# Patient Record
Sex: MALE | Race: WHITE | NOT HISPANIC OR LATINO | URBAN - METROPOLITAN AREA
[De-identification: names, ages, dates, MRNs, and addresses within clinical notes are randomized per-mention and may not be internally consistent; named-entity substitution may affect disease eponyms.]

---

## 2022-10-04 ENCOUNTER — EMERGENCY (EMERGENCY)
Facility: HOSPITAL | Age: 72
LOS: 0 days | Discharge: HOME | End: 2022-10-04
Attending: EMERGENCY MEDICINE
Payer: MEDICARE

## 2022-10-04 ENCOUNTER — TRANSCRIPTION ENCOUNTER (OUTPATIENT)
Age: 72
End: 2022-10-04

## 2022-10-04 VITALS
HEART RATE: 85 BPM | OXYGEN SATURATION: 100 % | TEMPERATURE: 98 F | DIASTOLIC BLOOD PRESSURE: 80 MMHG | RESPIRATION RATE: 18 BRPM | SYSTOLIC BLOOD PRESSURE: 142 MMHG

## 2022-10-04 VITALS — HEIGHT: 69 IN | WEIGHT: 169.98 LBS

## 2022-10-04 DIAGNOSIS — R50.9 FEVER, UNSPECIFIED: ICD-10-CM

## 2022-10-04 DIAGNOSIS — K59.00 CONSTIPATION, UNSPECIFIED: ICD-10-CM

## 2022-10-04 DIAGNOSIS — N41.9 INFLAMMATORY DISEASE OF PROSTATE, UNSPECIFIED: ICD-10-CM

## 2022-10-04 DIAGNOSIS — R19.7 DIARRHEA, UNSPECIFIED: ICD-10-CM

## 2022-10-04 DIAGNOSIS — Z90.49 ACQUIRED ABSENCE OF OTHER SPECIFIED PARTS OF DIGESTIVE TRACT: ICD-10-CM

## 2022-10-04 DIAGNOSIS — R10.9 UNSPECIFIED ABDOMINAL PAIN: ICD-10-CM

## 2022-10-04 DIAGNOSIS — N45.1 EPIDIDYMITIS: ICD-10-CM

## 2022-10-04 DIAGNOSIS — K76.0 FATTY (CHANGE OF) LIVER, NOT ELSEWHERE CLASSIFIED: ICD-10-CM

## 2022-10-04 DIAGNOSIS — N39.0 URINARY TRACT INFECTION, SITE NOT SPECIFIED: ICD-10-CM

## 2022-10-04 DIAGNOSIS — N50.89 OTHER SPECIFIED DISORDERS OF THE MALE GENITAL ORGANS: ICD-10-CM

## 2022-10-04 DIAGNOSIS — Z90.89 ACQUIRED ABSENCE OF OTHER ORGANS: ICD-10-CM

## 2022-10-04 DIAGNOSIS — N40.0 BENIGN PROSTATIC HYPERPLASIA WITHOUT LOWER URINARY TRACT SYMPTOMS: ICD-10-CM

## 2022-10-04 LAB
ALBUMIN SERPL ELPH-MCNC: 3 G/DL — LOW (ref 3.3–5)
ALP SERPL-CCNC: 85 U/L — SIGNIFICANT CHANGE UP (ref 40–120)
ALT FLD-CCNC: 44 U/L — SIGNIFICANT CHANGE UP (ref 12–78)
ANION GAP SERPL CALC-SCNC: 5 MMOL/L — SIGNIFICANT CHANGE UP (ref 5–17)
APPEARANCE UR: ABNORMAL
AST SERPL-CCNC: 17 U/L — SIGNIFICANT CHANGE UP (ref 15–37)
BASOPHILS # BLD AUTO: 0 K/UL — SIGNIFICANT CHANGE UP (ref 0–0.2)
BASOPHILS NFR BLD AUTO: 0 % — SIGNIFICANT CHANGE UP (ref 0–2)
BILIRUB SERPL-MCNC: 0.6 MG/DL — SIGNIFICANT CHANGE UP (ref 0.2–1.2)
BILIRUB UR-MCNC: NEGATIVE — SIGNIFICANT CHANGE UP
BUN SERPL-MCNC: 18 MG/DL — SIGNIFICANT CHANGE UP (ref 7–23)
CALCIUM SERPL-MCNC: 9 MG/DL — SIGNIFICANT CHANGE UP (ref 8.5–10.1)
CHLORIDE SERPL-SCNC: 104 MMOL/L — SIGNIFICANT CHANGE UP (ref 96–108)
CO2 SERPL-SCNC: 29 MMOL/L — SIGNIFICANT CHANGE UP (ref 22–31)
COLOR SPEC: SIGNIFICANT CHANGE UP
CREAT SERPL-MCNC: 0.86 MG/DL — SIGNIFICANT CHANGE UP (ref 0.5–1.3)
DIFF PNL FLD: ABNORMAL
EGFR: 93 ML/MIN/1.73M2 — SIGNIFICANT CHANGE UP
EOSINOPHIL # BLD AUTO: 0.54 K/UL — HIGH (ref 0–0.5)
EOSINOPHIL NFR BLD AUTO: 5 % — SIGNIFICANT CHANGE UP (ref 0–6)
GI PCR PANEL: SIGNIFICANT CHANGE UP
GLUCOSE SERPL-MCNC: 109 MG/DL — HIGH (ref 70–99)
GLUCOSE UR QL: NEGATIVE — SIGNIFICANT CHANGE UP
HCT VFR BLD CALC: 36.4 % — LOW (ref 39–50)
HGB BLD-MCNC: 12.5 G/DL — LOW (ref 13–17)
KETONES UR-MCNC: NEGATIVE — SIGNIFICANT CHANGE UP
LACTATE SERPL-SCNC: 0.7 MMOL/L — SIGNIFICANT CHANGE UP (ref 0.7–2)
LEUKOCYTE ESTERASE UR-ACNC: ABNORMAL
LIDOCAIN IGE QN: 141 U/L — SIGNIFICANT CHANGE UP (ref 73–393)
LYMPHOCYTES # BLD AUTO: 1.3 K/UL — SIGNIFICANT CHANGE UP (ref 1–3.3)
LYMPHOCYTES # BLD AUTO: 12 % — LOW (ref 13–44)
MCHC RBC-ENTMCNC: 31.3 PG — SIGNIFICANT CHANGE UP (ref 27–34)
MCHC RBC-ENTMCNC: 34.3 GM/DL — SIGNIFICANT CHANGE UP (ref 32–36)
MCV RBC AUTO: 91.2 FL — SIGNIFICANT CHANGE UP (ref 80–100)
MONOCYTES # BLD AUTO: 1.3 K/UL — HIGH (ref 0–0.9)
MONOCYTES NFR BLD AUTO: 12 % — SIGNIFICANT CHANGE UP (ref 2–14)
NEUTROPHILS # BLD AUTO: 7.57 K/UL — HIGH (ref 1.8–7.4)
NEUTROPHILS NFR BLD AUTO: 70 % — SIGNIFICANT CHANGE UP (ref 43–77)
NITRITE UR-MCNC: POSITIVE
NRBC # BLD: SIGNIFICANT CHANGE UP /100 WBCS (ref 0–0)
PH UR: 5 — SIGNIFICANT CHANGE UP (ref 5–8)
PLATELET # BLD AUTO: 223 K/UL — SIGNIFICANT CHANGE UP (ref 150–400)
POTASSIUM SERPL-MCNC: 3.5 MMOL/L — SIGNIFICANT CHANGE UP (ref 3.5–5.3)
POTASSIUM SERPL-SCNC: 3.5 MMOL/L — SIGNIFICANT CHANGE UP (ref 3.5–5.3)
PROT SERPL-MCNC: 7.1 GM/DL — SIGNIFICANT CHANGE UP (ref 6–8.3)
PROT UR-MCNC: 15
RBC # BLD: 3.99 M/UL — LOW (ref 4.2–5.8)
RBC # FLD: 12.4 % — SIGNIFICANT CHANGE UP (ref 10.3–14.5)
SODIUM SERPL-SCNC: 138 MMOL/L — SIGNIFICANT CHANGE UP (ref 135–145)
SP GR SPEC: 1 — LOW (ref 1.01–1.02)
UROBILINOGEN FLD QL: NEGATIVE — SIGNIFICANT CHANGE UP
WBC # BLD: 10.82 K/UL — HIGH (ref 3.8–10.5)
WBC # FLD AUTO: 10.82 K/UL — HIGH (ref 3.8–10.5)

## 2022-10-04 PROCEDURE — 99284 EMERGENCY DEPT VISIT MOD MDM: CPT

## 2022-10-04 PROCEDURE — 83690 ASSAY OF LIPASE: CPT

## 2022-10-04 PROCEDURE — 96365 THER/PROPH/DIAG IV INF INIT: CPT | Mod: XU

## 2022-10-04 PROCEDURE — 93975 VASCULAR STUDY: CPT

## 2022-10-04 PROCEDURE — 81001 URINALYSIS AUTO W/SCOPE: CPT

## 2022-10-04 PROCEDURE — 83605 ASSAY OF LACTIC ACID: CPT

## 2022-10-04 PROCEDURE — 96376 TX/PRO/DX INJ SAME DRUG ADON: CPT

## 2022-10-04 PROCEDURE — 80053 COMPREHEN METABOLIC PANEL: CPT

## 2022-10-04 PROCEDURE — 93975 VASCULAR STUDY: CPT | Mod: 26

## 2022-10-04 PROCEDURE — 76870 US EXAM SCROTUM: CPT | Mod: 26

## 2022-10-04 PROCEDURE — 74177 CT ABD & PELVIS W/CONTRAST: CPT | Mod: MA

## 2022-10-04 PROCEDURE — 76870 US EXAM SCROTUM: CPT

## 2022-10-04 PROCEDURE — 96375 TX/PRO/DX INJ NEW DRUG ADDON: CPT

## 2022-10-04 PROCEDURE — 96361 HYDRATE IV INFUSION ADD-ON: CPT

## 2022-10-04 PROCEDURE — 87507 IADNA-DNA/RNA PROBE TQ 12-25: CPT

## 2022-10-04 PROCEDURE — 85025 COMPLETE CBC W/AUTO DIFF WBC: CPT

## 2022-10-04 PROCEDURE — 99285 EMERGENCY DEPT VISIT HI MDM: CPT | Mod: 25

## 2022-10-04 PROCEDURE — 74177 CT ABD & PELVIS W/CONTRAST: CPT | Mod: 26,MA

## 2022-10-04 PROCEDURE — 36415 COLL VENOUS BLD VENIPUNCTURE: CPT

## 2022-10-04 RX ORDER — SODIUM CHLORIDE 9 MG/ML
1000 INJECTION INTRAMUSCULAR; INTRAVENOUS; SUBCUTANEOUS ONCE
Refills: 0 | Status: COMPLETED | OUTPATIENT
Start: 2022-10-04 | End: 2022-10-04

## 2022-10-04 RX ORDER — KETOROLAC TROMETHAMINE 30 MG/ML
15 SYRINGE (ML) INJECTION ONCE
Refills: 0 | Status: DISCONTINUED | OUTPATIENT
Start: 2022-10-04 | End: 2022-10-04

## 2022-10-04 RX ORDER — CEFTRIAXONE 500 MG/1
1000 INJECTION, POWDER, FOR SOLUTION INTRAMUSCULAR; INTRAVENOUS ONCE
Refills: 0 | Status: COMPLETED | OUTPATIENT
Start: 2022-10-04 | End: 2022-10-04

## 2022-10-04 RX ORDER — TAMSULOSIN HYDROCHLORIDE 0.4 MG/1
1 CAPSULE ORAL
Qty: 30 | Refills: 0
Start: 2022-10-04 | End: 2022-11-02

## 2022-10-04 RX ADMIN — CEFTRIAXONE 100 MILLIGRAM(S): 500 INJECTION, POWDER, FOR SOLUTION INTRAMUSCULAR; INTRAVENOUS at 11:25

## 2022-10-04 RX ADMIN — SODIUM CHLORIDE 1000 MILLILITER(S): 9 INJECTION INTRAMUSCULAR; INTRAVENOUS; SUBCUTANEOUS at 11:25

## 2022-10-04 RX ADMIN — Medication 15 MILLIGRAM(S): at 12:08

## 2022-10-04 RX ADMIN — CEFTRIAXONE 1000 MILLIGRAM(S): 500 INJECTION, POWDER, FOR SOLUTION INTRAMUSCULAR; INTRAVENOUS at 12:08

## 2022-10-04 RX ADMIN — SODIUM CHLORIDE 1000 MILLILITER(S): 9 INJECTION INTRAMUSCULAR; INTRAVENOUS; SUBCUTANEOUS at 09:00

## 2022-10-04 RX ADMIN — Medication 15 MILLIGRAM(S): at 09:47

## 2022-10-04 NOTE — ED PROVIDER NOTE - PATIENT PORTAL LINK FT
You can access the FollowMyHealth Patient Portal offered by [facility] by registering at the following website: [email or web address]. By joining [employer]’s FollowMyHealth portal, you will also be able to view your health information using other applications (apps) compatible with our system.

## 2022-10-04 NOTE — ED ADULT NURSE NOTE - CHIEF COMPLAINT QUOTE
Pt reports abdominal pain "on Wednesday night I suffered from food poisoning, for about 10 hrs and then I was severely constipated until Saturday at 245pm and it was diarrhea. then I felt better on Sunday. On Monday I developed a cramp in my left lower abdomen, and now it is radiating to my testicle." pt denies fever, chills, SOB/dizziness, CP. pt reports fever or 102F on Thursday.

## 2022-10-04 NOTE — ED PROVIDER NOTE - NSICDXPASTSURGICALHX_GEN_ALL_CORE_FT
PAST SURGICAL HISTORY:  H/O arthroscopy right shoulder 10 yrs ago    H/O arthroscopy of right knee 15 yrs ago    H/O colonoscopy     Hx of appendectomy     Hx of tonsillectomy

## 2022-10-04 NOTE — ED PROVIDER NOTE - NS ED ROS FT
Constitutional: No fevers, chills, or sweats.  Cardiac: No chest pain, exertional dyspnea, orthopnea  Respiratory: No shortness of breath, no cough  GI: +abd pain   Neuro: No headaches, no neck pain/stiffness, no numbness  All other systems reviewed and are negative unless otherwise stated in the HPI.

## 2022-10-04 NOTE — ED PROVIDER NOTE - OBJECTIVE STATEMENT
73 y/o male with a PMHx of BPH, elbow disorder, right inguinal hernia, h/o appendectomy presents to the ED c/o abd pain. Pt reports last week he had baked clams and after he developed fever and chills. Pt notes after fevers and chills resolved he was constipated. Pt had an episode of diarrhea 2 days ago and felt better. Pt reports yesterday around 5pm he had onset of abd pain radiating to his testicles. Pt passing gas. Nonsmoker. No EtOH use. No drug use. No other complaints at this time.

## 2022-10-04 NOTE — ED PROVIDER NOTE - PHYSICAL EXAMINATION
General: AAOx3, NAD  HEENT: NCAT  Cardiac: Normal rate and rhythym, no murmurs, normal peripheral perfusion  Respiratory: Normal rate and effort. CTAB  GI: Soft, nondistended. Mild LLQ TTP. No mass or palpable hernia.  Neuro: No focal deficits. DUNLAP equally x4, sensation to light touch intact throughout  MSK: FROMx4, no focal bony tenderness, no peripheral edema  Skin: No rash

## 2022-10-04 NOTE — ED PROVIDER NOTE - PROGRESS NOTE DETAILS
CT, US, UA with UTI, ?prostatitis, +epididymitis. Low risk for STI. UCx sent. Will start levaquin 500mg qd, will Rx 21d but pt understands he needs otpt fu in the interim. Return precautions discussed

## 2022-10-04 NOTE — ED PROVIDER NOTE - NSFOLLOWUPINSTRUCTIONS_ED_ALL_ED_FT
Return to the Emergency Department for worsening or persistent symptoms, and/or ANY NEW OR CONCERNING SYMPTOMS. If you have issues obtaining follow up, please call: [phone] or [phone]  to obtain a doctor or specialist who takes your insurance in your area.      Epididymitis    The male reproductive organ, showing the epididymis and the testicle.   Epididymitis is inflammation or swelling of the epididymis. This is caused by an infection. The epididymis is a cord-like structure that is located along the top and back part of the testicle. It collects and stores sperm from the testicle.    This condition can also cause pain and swelling of the testicle and scrotum. Symptoms usually start suddenly (acute epididymitis). Sometimes epididymitis starts gradually and lasts for a while (chronic epididymitis). Chronic epididymitis may be harder to treat.      What are the causes?    In men ages 20–40, this condition is usually caused by a bacterial infection or a sexually transmitted infection (STI), such as gonorrhea or chlamydia.    In men 40 and older, this condition is usually caused by bacteria from a urinary blockage or from abnormalities in the urinary system. These can result from:  •Having a tube placed into the bladder (urinary catheter).      •Having an enlarged or inflamed prostate gland.      •Having recently had urinary tract surgery.      •Having a problem with a backward flow of urine (retrograde).      In men who have a condition that weakens the body's defense system (immune system), such as human immunodeficiency virus (HIV), this condition can be caused by:  •Other bacteria, including tuberculosis and syphilis.      •Viruses.      •Fungi.      Sometimes this condition occurs without infection. This may happen because of trauma or repetitive activities such as sports.      What increases the risk?    You are more likely to develop this condition if you have:  •Unprotected sex with more than one partner.      •Anal sex.      •Had recent surgery.      •A urinary catheter.      •Urinary problems.      •A suppressed immune system.        What are the signs or symptoms?    This condition usually begins suddenly with chills, fever, and pain behind the scrotum and in the testicle. Other symptoms include:  •Swelling of the scrotum, testicle, or both.      •Pain when ejaculating or urinating.      •Pain in the back or abdomen.      •Nausea.      •Itching and discharge from the penis.      •A frequent need to pass urine.      •Redness, increased warmth, and tenderness of the scrotum.        How is this diagnosed?    Your health care provider can diagnose this condition based on your symptoms and medical history. Your health care provider will also do a physical exam to check your scrotum and testicle for swelling, pain, and redness. You may also have other tests, including:  •Testing of discharge from the penis.      •Testing your urine for infections, such as STIs.      •Ultrasound to check for blood flow and inflammation.      Your health care provider may test you for other STIs, including HIV.      How is this treated?    Treatment for this condition depends on the cause. If your condition is caused by a bacterial infection, oral antibiotic medicine may be prescribed. If the bacterial infection has spread to your blood, you may need to receive IV antibiotics.    For both bacterial and nonbacterial epididymitis, you may be treated with:  •Rest.      •Elevation of the scrotum.      •Pain medicines.      •Anti-inflammatory medicines.      Surgery may be needed if:  •You have pus buildup in the scrotum (abscess).      •You have epididymitis that has not responded to other treatments.        Follow these instructions at home:    Medicines     •Take over-the-counter and prescription medicines only as told by your health care provider.      •If you were prescribed an antibiotic medicine, take it as told by your health care provider. Do not stop taking the antibiotic even if your condition improves.      Sexual activity     •If your epididymitis was caused by an STI, avoid sexual activity until your treatment is complete.      •Inform your sexual partner or partners if you test positive for an STI. They may need to be treated. Do not engage in sexual activity with your partner or partners until their treatment is completed.        Managing pain and swelling   A bathtub partially filled with water. •If directed, raise (elevate) your scrotum and apply ice. To do this:  •Put ice in a plastic bag.      •Place a small towel or pillow between your legs.      •Rest your scrotum on the pillow or towel.      •Place another towel between your skin and the plastic bag.      •Leave the ice on for 20 minutes, 2–3 times a day.      •Remove the ice if your skin turns bright red. This is very important. If you cannot feel pain, heat, or cold, you have a greater risk of damage to the area.        •Keep your scrotum elevated and supported while resting. Ask your health care provider if you should wear a scrotal support, such as a jockstrap. Wear it as told by your health care provider.      •Try taking a sitz bath to help with discomfort. This is a warm water bath that is taken while you are sitting down. The water should come up to your hips and should cover your buttocks. Do this 3–4 times per day or as told by your health care provider.      General instructions     •Drink enough fluid to keep your urine pale yellow.      •Return to your normal activities as told by your health care provider. Ask your health care provider what activities are safe for you.      •Keep all follow-up visits. This is important.        Contact a health care provider if:    •You have a fever.      •Your pain medicine is not helping.      •Your pain is getting worse.      •Your symptoms do not improve within 3 days.        Summary    •Epididymitis is inflammation or swelling of the epididymis. This is caused by an infection. This condition can also cause pain and swelling of the testicle and scrotum.      •Treatment for this condition depends on the cause. If your condition is caused by a bacterial infection, oral antibiotic medicine may be prescribed.      •Inform your sexual partner or partners if you test positive for an STI. They may need to be treated. Do not engage in sexual activity with your partner or partners until their treatment is completed.      •Contact a health care provider if your symptoms do not improve within 3 days.      This information is not intended to replace advice given to you by your health care provider. Make sure you discuss any questions you have with your health care provider.

## 2022-10-04 NOTE — ED PROVIDER NOTE - NSICDXPASTMEDICALHX_GEN_ALL_CORE_FT
PAST MEDICAL HISTORY:  Elbow disorder right elbow epicondylitis    Right inguinal hernia       History of BPH

## 2022-10-04 NOTE — ED PROVIDER NOTE - CARE PLAN
1 Principal Discharge DX:	Epididymitis  Secondary Diagnosis:	Acute UTI  Secondary Diagnosis:	Prostatitis

## 2022-10-04 NOTE — ED ADULT TRIAGE NOTE - CHIEF COMPLAINT QUOTE
Family Pt reports abdominal pain "on Wednesday night I suffered from food poisoning, for about 10 hrs and then I was severely constipated until Saturday at 245pm and it was diarrhea. then I felt better on Sunday. On Monday I developed a cramp in my left lower abdomen, and now it is radiating to my testicle." pt denies fever, chills, SOB/dizziness, CP. pt reports fever or 102F on Thursday.

## 2022-10-12 ENCOUNTER — APPOINTMENT (OUTPATIENT)
Dept: UROLOGY | Facility: CLINIC | Age: 72
End: 2022-10-12

## 2023-06-02 ENCOUNTER — APPOINTMENT (OUTPATIENT)
Dept: OTOLARYNGOLOGY | Facility: CLINIC | Age: 73
End: 2023-06-02
Payer: MEDICARE

## 2023-06-02 ENCOUNTER — NON-APPOINTMENT (OUTPATIENT)
Age: 73
End: 2023-06-02

## 2023-06-02 VITALS — BODY MASS INDEX: 24.18 KG/M2 | HEIGHT: 69.5 IN | WEIGHT: 167 LBS

## 2023-06-02 VITALS — HEART RATE: 60 BPM | DIASTOLIC BLOOD PRESSURE: 67 MMHG | SYSTOLIC BLOOD PRESSURE: 111 MMHG

## 2023-06-02 DIAGNOSIS — K21.9 GASTRO-ESOPHAGEAL REFLUX DISEASE W/OUT ESOPHAGITIS: ICD-10-CM

## 2023-06-02 PROBLEM — Z87.438 PERSONAL HISTORY OF OTHER DISEASES OF MALE GENITAL ORGANS: Chronic | Status: ACTIVE | Noted: 2022-10-04

## 2023-06-02 PROCEDURE — 99204 OFFICE O/P NEW MOD 45 MIN: CPT | Mod: 25

## 2023-06-02 PROCEDURE — 31575 DIAGNOSTIC LARYNGOSCOPY: CPT

## 2023-06-02 PROCEDURE — 92557 COMPREHENSIVE HEARING TEST: CPT

## 2023-06-02 PROCEDURE — 92567 TYMPANOMETRY: CPT

## 2023-06-02 NOTE — HISTORY OF PRESENT ILLNESS
[de-identified] : DIZZY SPELLS AT TIMES FOR MANY YEARS ; MOSTLY POSITIONAL CHANGE AND IT LAST ONLY 4 TO 5 SECONDS\par RIGHT NOSTRIL LESION FEELING FOR 2 WEEKS\par THROAT FEELS SOMETHING STUCK SOMETIME\par MEDICAL HX REVIEWED\par BLOOD WORK UP TO DATE AS PER PATIENT AND WNL

## 2023-06-02 NOTE — DATA REVIEWED
[de-identified] : Type Ad tymp measured in Right Ear.\par Type A tymp measured in the Left Ear.\par Right Ear: Normal hearing up to 2kHz, sloping to a moderate SNHL.\par Left Ear: Normal hearing up to 1kHz, sloping to a moderate to moderately-severe SNHL.\par Left Ear:

## 2023-06-02 NOTE — ASSESSMENT
[FreeTextEntry1] :  MILD TO MODERATE SNHL 4000  WITH SLIGHT ASYMETRY\par HEARING AIDS DISCUSSED\par GERD INSTRUCTION\par BACITRACIN BID INTRANASAL\par F/U 2 WEEKS\par WILL CONSIDER BIOPSY

## 2023-06-02 NOTE — REVIEW OF SYSTEMS
[Dizziness] : dizziness [Vertigo] : vertigo [Lightheadedness] : lightheadedness [Throat Clearing] : throat clearing [Throat Dryness] : throat dryness [Throat Itching] : throat itching [Negative] : Heme/Lymph [Patient Intake Form Reviewed] : Patient intake form was reviewed [FreeTextEntry1] : joint aches

## 2023-06-02 NOTE — PHYSICAL EXAM
[Normal] : mucosa is normal [Midline] : trachea located in midline position [de-identified] : NON OBSTRUCTING EXOSTOSIS ON THE RIGHT [de-identified] : RIGHT NOSTRIL LESION AT THE MUCOSOCUTANEOUS JUNCTION ABOUT 6MM

## 2023-06-19 ENCOUNTER — APPOINTMENT (OUTPATIENT)
Dept: OTOLARYNGOLOGY | Facility: CLINIC | Age: 73
End: 2023-06-19
Payer: MEDICARE

## 2023-06-19 PROCEDURE — 92540 BASIC VESTIBULAR EVALUATION: CPT

## 2023-06-19 PROCEDURE — 92537 CALORIC VSTBLR TEST W/REC: CPT

## 2023-06-27 ENCOUNTER — APPOINTMENT (OUTPATIENT)
Dept: OTOLARYNGOLOGY | Facility: CLINIC | Age: 73
End: 2023-06-27
Payer: MEDICARE

## 2023-06-27 VITALS
WEIGHT: 165 LBS | HEART RATE: 67 BPM | DIASTOLIC BLOOD PRESSURE: 69 MMHG | SYSTOLIC BLOOD PRESSURE: 109 MMHG | HEIGHT: 69.5 IN | BODY MASS INDEX: 23.89 KG/M2

## 2023-06-27 DIAGNOSIS — R42 DIZZINESS AND GIDDINESS: ICD-10-CM

## 2023-06-27 DIAGNOSIS — L98.9 DISORDER OF THE SKIN AND SUBCUTANEOUS TISSUE, UNSPECIFIED: ICD-10-CM

## 2023-06-27 PROCEDURE — 99214 OFFICE O/P EST MOD 30 MIN: CPT

## 2023-06-27 NOTE — HISTORY OF PRESENT ILLNESS
[de-identified] : F/U VNG/ RIGHT NOSTRIL FEELING BETTER\par DIZZY SPELLS AT TIMES FOR MANY YEARS ; MOSTLY POSITIONAL CHANGE AND IT LAST ONLY 4 TO 5 SECONDS\par RIGHT NOSTRIL LESION FEELING FOR 2 WEEKS\par THROAT FEELS SOMETHING STUCK SOMETIME\par MEDICAL HX REVIEWED\par BLOOD WORK UP TO DATE AS PER PATIENT AND WNL

## 2023-06-27 NOTE — PHYSICAL EXAM
[de-identified] : NON OBSTRUCTING EXOSTOSIS ON THE RIGHT [de-identified] : RIGHT NOSTRIL PREVIOUSLY SEEN IRRITATION HEALED  [Normal] : mucosa is normal [Midline] : trachea located in midline position

## 2023-06-27 NOTE — ASSESSMENT
[FreeTextEntry1] : NASAL LESION HEALED\par CONTINUE BACITRACIN\par VNG REVIEWED\par NOT VERY TYPICAL FOR BPV\par CARDIOLOGY WORK UP / CAROTID DOPPLER DISCUSSED/ ADVISED\par F/U AFTER ABOVE

## 2023-06-30 ENCOUNTER — APPOINTMENT (OUTPATIENT)
Dept: OTOLARYNGOLOGY | Facility: CLINIC | Age: 73
End: 2023-06-30

## 2023-07-06 ENCOUNTER — APPOINTMENT (OUTPATIENT)
Dept: ULTRASOUND IMAGING | Facility: CLINIC | Age: 73
End: 2023-07-06
Payer: MEDICARE

## 2023-07-06 PROCEDURE — 93880 EXTRACRANIAL BILAT STUDY: CPT

## 2024-06-18 NOTE — ED ADULT TRIAGE NOTE - SOURCE OF INFORMATION
Refill request received for   metFORMIN (GLUCOPHAGE-XR) 500 MG 24 hr tablet   Last office visit: 1/18/2024  Next office visit: 7/18/2024  Last refill: 1/18/24  Last labs:  1/4/24  Active fasting labs.   Live well message reminding pt to complete fasting labs at least 1 wk before appt.   
Patient

## 2024-12-20 ENCOUNTER — NON-APPOINTMENT (OUTPATIENT)
Age: 74
End: 2024-12-20